# Patient Record
Sex: FEMALE | Race: WHITE | NOT HISPANIC OR LATINO | Employment: PART TIME | URBAN - METROPOLITAN AREA
[De-identification: names, ages, dates, MRNs, and addresses within clinical notes are randomized per-mention and may not be internally consistent; named-entity substitution may affect disease eponyms.]

---

## 2023-12-21 ENCOUNTER — HOSPITAL ENCOUNTER (EMERGENCY)
Facility: HOSPITAL | Age: 33
Discharge: HOME | End: 2023-12-21
Attending: EMERGENCY MEDICINE
Payer: COMMERCIAL

## 2023-12-21 VITALS
HEART RATE: 66 BPM | HEIGHT: 61 IN | BODY MASS INDEX: 27.38 KG/M2 | RESPIRATION RATE: 16 BRPM | DIASTOLIC BLOOD PRESSURE: 88 MMHG | TEMPERATURE: 98.2 F | WEIGHT: 145 LBS | SYSTOLIC BLOOD PRESSURE: 138 MMHG | OXYGEN SATURATION: 100 %

## 2023-12-21 DIAGNOSIS — R00.2 PALPITATIONS: ICD-10-CM

## 2023-12-21 DIAGNOSIS — R00.0 SINUS TACHYCARDIA: ICD-10-CM

## 2023-12-21 DIAGNOSIS — G47.00 INSOMNIA, UNSPECIFIED TYPE: Primary | ICD-10-CM

## 2023-12-21 LAB
ANION GAP SERPL CALCULATED.3IONS-SCNC: 10.5 MMOL/L (ref 5–15)
BASOPHILS # BLD AUTO: 0.04 10*3/MM3 (ref 0–0.2)
BASOPHILS NFR BLD AUTO: 0.8 % (ref 0–1.5)
BUN SERPL-MCNC: 9 MG/DL (ref 6–20)
BUN/CREAT SERPL: 13 (ref 7–25)
CALCIUM SPEC-SCNC: 9.4 MG/DL (ref 8.6–10.5)
CHLORIDE SERPL-SCNC: 105 MMOL/L (ref 98–107)
CO2 SERPL-SCNC: 24.5 MMOL/L (ref 22–29)
CREAT SERPL-MCNC: 0.69 MG/DL (ref 0.57–1)
DEPRECATED RDW RBC AUTO: 39.7 FL (ref 37–54)
EGFRCR SERPLBLD CKD-EPI 2021: 117 ML/MIN/1.73
EOSINOPHIL # BLD AUTO: 0.11 10*3/MM3 (ref 0–0.4)
EOSINOPHIL NFR BLD AUTO: 2.1 % (ref 0.3–6.2)
ERYTHROCYTE [DISTWIDTH] IN BLOOD BY AUTOMATED COUNT: 12.2 % (ref 12.3–15.4)
GLUCOSE SERPL-MCNC: 91 MG/DL (ref 65–99)
HCG SERPL QL: NEGATIVE
HCT VFR BLD AUTO: 41.7 % (ref 34–46.6)
HGB BLD-MCNC: 13.7 G/DL (ref 12–15.9)
IMM GRANULOCYTES # BLD AUTO: 0.02 10*3/MM3 (ref 0–0.05)
IMM GRANULOCYTES NFR BLD AUTO: 0.4 % (ref 0–0.5)
LYMPHOCYTES # BLD AUTO: 1.93 10*3/MM3 (ref 0.7–3.1)
LYMPHOCYTES NFR BLD AUTO: 37 % (ref 19.6–45.3)
MAGNESIUM SERPL-MCNC: 2.2 MG/DL (ref 1.6–2.6)
MCH RBC QN AUTO: 29 PG (ref 26.6–33)
MCHC RBC AUTO-ENTMCNC: 32.9 G/DL (ref 31.5–35.7)
MCV RBC AUTO: 88.2 FL (ref 79–97)
MONOCYTES # BLD AUTO: 0.7 10*3/MM3 (ref 0.1–0.9)
MONOCYTES NFR BLD AUTO: 13.4 % (ref 5–12)
NEUTROPHILS NFR BLD AUTO: 2.42 10*3/MM3 (ref 1.7–7)
NEUTROPHILS NFR BLD AUTO: 46.3 % (ref 42.7–76)
NRBC BLD AUTO-RTO: 0.2 /100 WBC (ref 0–0.2)
PLATELET # BLD AUTO: 238 10*3/MM3 (ref 140–450)
PMV BLD AUTO: 10.5 FL (ref 6–12)
POTASSIUM SERPL-SCNC: 3.9 MMOL/L (ref 3.5–5.2)
RBC # BLD AUTO: 4.73 10*6/MM3 (ref 3.77–5.28)
SODIUM SERPL-SCNC: 140 MMOL/L (ref 136–145)
T4 FREE SERPL-MCNC: 1.75 NG/DL (ref 0.93–1.7)
TSH SERPL DL<=0.05 MIU/L-ACNC: 3.52 UIU/ML (ref 0.27–4.2)
WBC NRBC COR # BLD AUTO: 5.22 10*3/MM3 (ref 3.4–10.8)

## 2023-12-21 PROCEDURE — 84703 CHORIONIC GONADOTROPIN ASSAY: CPT | Performed by: EMERGENCY MEDICINE

## 2023-12-21 PROCEDURE — 93005 ELECTROCARDIOGRAM TRACING: CPT | Performed by: EMERGENCY MEDICINE

## 2023-12-21 PROCEDURE — 84439 ASSAY OF FREE THYROXINE: CPT | Performed by: EMERGENCY MEDICINE

## 2023-12-21 PROCEDURE — 80048 BASIC METABOLIC PNL TOTAL CA: CPT | Performed by: EMERGENCY MEDICINE

## 2023-12-21 PROCEDURE — 85025 COMPLETE CBC W/AUTO DIFF WBC: CPT | Performed by: EMERGENCY MEDICINE

## 2023-12-21 PROCEDURE — 99283 EMERGENCY DEPT VISIT LOW MDM: CPT

## 2023-12-21 PROCEDURE — 83735 ASSAY OF MAGNESIUM: CPT | Performed by: EMERGENCY MEDICINE

## 2023-12-21 PROCEDURE — 84443 ASSAY THYROID STIM HORMONE: CPT | Performed by: EMERGENCY MEDICINE

## 2023-12-21 PROCEDURE — 36415 COLL VENOUS BLD VENIPUNCTURE: CPT

## 2023-12-21 RX ORDER — HYDROXYZINE PAMOATE 25 MG/1
50 CAPSULE ORAL ONCE
Status: COMPLETED | OUTPATIENT
Start: 2023-12-21 | End: 2023-12-21

## 2023-12-21 RX ORDER — DEXTROAMPHETAMINE SACCHARATE, AMPHETAMINE ASPARTATE, DEXTROAMPHETAMINE SULFATE AND AMPHETAMINE SULFATE 5; 5; 5; 5 MG/1; MG/1; MG/1; MG/1
20 TABLET ORAL 3 TIMES DAILY
COMMUNITY

## 2023-12-21 RX ORDER — LORAZEPAM 1 MG/1
1 TABLET ORAL ONCE
Status: COMPLETED | OUTPATIENT
Start: 2023-12-21 | End: 2023-12-21

## 2023-12-21 RX ORDER — HYDROXYZINE PAMOATE 50 MG/1
50-100 CAPSULE ORAL NIGHTLY PRN
Qty: 20 CAPSULE | Refills: 0 | Status: SHIPPED | OUTPATIENT
Start: 2023-12-21

## 2023-12-21 RX ADMIN — HYDROXYZINE PAMOATE 50 MG: 25 CAPSULE ORAL at 05:05

## 2023-12-21 RX ADMIN — LORAZEPAM 1 MG: 1 TABLET ORAL at 04:07

## 2023-12-21 NOTE — Clinical Note
[facility] EMERGENCY DEPARTMENT  [address]  [facility] [address]  Phone: [phone]    Angelia Clarke was seen and treated in our emergency department on 12/21/2023.  She may return to work on 12/22/2023.         Thank you for choosing [facility].    Graham Diams MD

## 2023-12-21 NOTE — ED PROVIDER NOTES
" EMERGENCY DEPARTMENT ENCOUNTER    Room Number:  12/12  PCP: Provider, No Known  Historian: Patient      HPI:  Chief Complaint: Insomnia  A complete HPI/ROS/PMH/PSH/SH/FH are unobtainable due to: Nothing  Context: Angelia Clarke is a 33 y.o. female who presents to the ED from home by private vehicle c/o insomnia for the past 3 days.  Patient tested positive for COVID 1 week ago.  She then slept \"all day\" for the next several days.  She has now been unable to sleep for the past 72 hours.  She has taken NyQuil and trazodone without relief.  Patient says that she feels tired but just cannot fall asleep..  She takes Adderall 3 times daily but has been on this for many years and is never affected her sleep.  She drinks several caffeinated beverages daily but this is also not unusual for her.  She complains of intermittent palpitations and feels like her heart is racing when she tries to go to sleep.  Denies fever, shortness of breath, dizziness, chest pain, abdominal pain, vomiting, or diarrhea.  She denies any recent stressors.  LMP was about 1 week ago.            PAST MEDICAL HISTORY  Active Ambulatory Problems     Diagnosis Date Noted    No Active Ambulatory Problems     Resolved Ambulatory Problems     Diagnosis Date Noted    No Resolved Ambulatory Problems     Past Medical History:   Diagnosis Date    ADHD          PAST SURGICAL HISTORY  History reviewed. No pertinent surgical history.      FAMILY HISTORY  History reviewed. No pertinent family history.      SOCIAL HISTORY  Social History     Socioeconomic History    Marital status: Single   Tobacco Use    Smoking status: Never    Smokeless tobacco: Never   Vaping Use    Vaping Use: Every day    Start date: 1/1/2018    Substances: Nicotine, Flavoring   Substance and Sexual Activity    Alcohol use: Yes     Alcohol/week: 3.0 standard drinks of alcohol     Types: 3 Cans of beer per week    Drug use: Never         ALLERGIES  Patient has no known allergies.    REVIEW OF " SYSTEMS  All systems have been reviewed and are negative except for those listed in the HPI      PHYSICAL EXAM  ED Triage Vitals   Temp Heart Rate Resp BP SpO2   12/21/23 0328 12/21/23 0328 12/21/23 0328 12/21/23 0339 12/21/23 0328   98.2 °F (36.8 °C) 107 18 141/98 100 %      Temp src Heart Rate Source Patient Position BP Location FiO2 (%)   12/21/23 0328 -- 12/21/23 0339 12/21/23 0339 --   Tympanic  Sitting Right arm        Physical Exam      GENERAL: Awake, alert, oriented x 3.  Well-developed, well-nourished female.  Resting comfortably in no acute distress  HENT: NCAT, nares patent  EYES: no scleral icterus  CV: regular rhythm, mildly tachycardic  RESPIRATORY: normal effort, clear to auscultation bilaterally  ABDOMEN: soft, nondistended, nontender  MUSCULOSKELETAL: Extremities are nontender with full range of motion  NEURO: Speech is clear and fluent.  No facial droop.  Follows commands  PSYCH:  calm, cooperative  SKIN: warm, dry    Vital signs and nursing notes reviewed.          LAB RESULTS  Recent Results (from the past 24 hour(s))   Basic Metabolic Panel    Collection Time: 12/21/23  4:04 AM    Specimen: Arm, Right; Blood   Result Value Ref Range    Glucose 91 65 - 99 mg/dL    BUN 9 6 - 20 mg/dL    Creatinine 0.69 0.57 - 1.00 mg/dL    Sodium 140 136 - 145 mmol/L    Potassium 3.9 3.5 - 5.2 mmol/L    Chloride 105 98 - 107 mmol/L    CO2 24.5 22.0 - 29.0 mmol/L    Calcium 9.4 8.6 - 10.5 mg/dL    BUN/Creatinine Ratio 13.0 7.0 - 25.0    Anion Gap 10.5 5.0 - 15.0 mmol/L    eGFR 117.0 >60.0 mL/min/1.73   TSH    Collection Time: 12/21/23  4:04 AM    Specimen: Arm, Right; Blood   Result Value Ref Range    TSH 3.520 0.270 - 4.200 uIU/mL   T4, Free    Collection Time: 12/21/23  4:04 AM    Specimen: Arm, Right; Blood   Result Value Ref Range    Free T4 1.75 (H) 0.93 - 1.70 ng/dL   Magnesium    Collection Time: 12/21/23  4:04 AM    Specimen: Arm, Right; Blood   Result Value Ref Range    Magnesium 2.2 1.6 - 2.6 mg/dL    hCG, Serum, Qualitative    Collection Time: 12/21/23  4:04 AM    Specimen: Arm, Right; Blood   Result Value Ref Range    HCG Qualitative Negative Negative   CBC Auto Differential    Collection Time: 12/21/23  4:04 AM    Specimen: Arm, Right; Blood   Result Value Ref Range    WBC 5.22 3.40 - 10.80 10*3/mm3    RBC 4.73 3.77 - 5.28 10*6/mm3    Hemoglobin 13.7 12.0 - 15.9 g/dL    Hematocrit 41.7 34.0 - 46.6 %    MCV 88.2 79.0 - 97.0 fL    MCH 29.0 26.6 - 33.0 pg    MCHC 32.9 31.5 - 35.7 g/dL    RDW 12.2 (L) 12.3 - 15.4 %    RDW-SD 39.7 37.0 - 54.0 fl    MPV 10.5 6.0 - 12.0 fL    Platelets 238 140 - 450 10*3/mm3    Neutrophil % 46.3 42.7 - 76.0 %    Lymphocyte % 37.0 19.6 - 45.3 %    Monocyte % 13.4 (H) 5.0 - 12.0 %    Eosinophil % 2.1 0.3 - 6.2 %    Basophil % 0.8 0.0 - 1.5 %    Immature Grans % 0.4 0.0 - 0.5 %    Neutrophils, Absolute 2.42 1.70 - 7.00 10*3/mm3    Lymphocytes, Absolute 1.93 0.70 - 3.10 10*3/mm3    Monocytes, Absolute 0.70 0.10 - 0.90 10*3/mm3    Eosinophils, Absolute 0.11 0.00 - 0.40 10*3/mm3    Basophils, Absolute 0.04 0.00 - 0.20 10*3/mm3    Immature Grans, Absolute 0.02 0.00 - 0.05 10*3/mm3    nRBC 0.2 0.0 - 0.2 /100 WBC       Ordered the above labs and reviewed the results.        RADIOLOGY  No Radiology Exams Resulted Within Past 24 Hours    Ordered the above noted radiological studies. Reviewed by me in PACS.            PROCEDURES  Procedures              MEDICATIONS GIVEN IN ER  Medications   LORazepam (ATIVAN) tablet 1 mg (1 mg Oral Given 12/21/23 4420)   hydrOXYzine pamoate (VISTARIL) capsule 50 mg (50 mg Oral Given 12/21/23 3824)                   MEDICAL DECISION MAKING, PROGRESS, and CONSULTS    All labs have been independently reviewed by me.  All radiology studies have been reviewed by me and I have also reviewed the radiology report.   EKG's independently viewed and interpreted by me.  Discussion below represents my analysis of pertinent findings related to patient's condition,  differential diagnosis, treatment plan and final disposition.      Additional sources:  - Discussed/ obtained information from independent historians: None    - External (non-ED) record review: Patient does not have any prior records in the EMR    - Chronic or social conditions impacting care: Patient does not currently have a primary care provider          Orders placed during this visit:  Orders Placed This Encounter   Procedures    Basic Metabolic Panel    TSH    T4, Free    Magnesium    hCG, Serum, Qualitative    CBC Auto Differential    ECG 12 Lead Other; Palpitations    CBC & Differential         Additional orders considered but not ordered:  None        Differential diagnosis:    Anxiety, stress reaction, hyperthyroidism, sinus tachycardia, arrhythmia      Independent interpretation of labs, radiology studies, and discussions with consultants:  ED Course as of 12/21/23 0612   u Dec 21, 2023   0417 EKG personally interpreted by me at 4:04 AM.  My personal interpretation is:          EKG time: 3:59 AM  Rhythm/Rate: Sinus rhythm, rate 79  P waves and NM: Normal  QRS, axis: Normal  ST and T waves: Normal    Interpreted Contemporaneously by me, independently viewed  No prior available for comparison    [WH]   0418 WBC: 5.22 [WH]   0418 Hemoglobin: 13.7 [WH]   0434 Magnesium: 2.2 [WH]   0434 Glucose: 91 [WH]   0434 Creatinine: 0.69 [WH]   0446 HCG Qualitative: Negative [WH]   0446 Free T4(!): 1.75 [WH]   0446 TSH Baseline: 3.520 [WH]   0457 Test results discussed with the patient.  She no longer feels like her heart is racing but she has not been able to fall asleep.  She will be given a dose of Vistaril. [WH]   0511 MDM: Patient presented to ED complaining of insomnia for the past 3 days.  She also complained of palpitations.  She denied increased stress or anxiety.  She was mildly tachycardic but was not hypoxic or tachypneic.  She denied chest pain or shortness of breath.  Labs were unremarkable.  Thyroid  studies and electrolytes were normal.  Pregnancy test was negative.  She was given Ativan and Vistaril.  She will be discharged with a prescription for Vistaril.  She was given the patient connection number to call to help her obtain a primary care provider. [WH]      ED Course User Index  [WH] Graham Dimas MD               DIAGNOSIS  Final diagnoses:   Insomnia, unspecified type   Palpitations   Sinus tachycardia         DISPOSITION  DISCHARGE    Patient discharged in stable condition.    Reviewed implications of results, diagnosis, meds, responsibility to follow up, warning signs and symptoms of possible worsening, potential complications and reasons to return to ER, including worsening/persistent symptoms, chest pain, shortness of breath, dizziness, fainting, or other concern.    Patient/Family voiced understanding of above instructions.    Discussed plan for discharge, as there is no emergent indication for admission. Patient referred to primary care provider for BP management due to today's BP. Pt/family is agreeable and understands need for follow up and repeat testing.  Pt is aware that discharge does not mean that nothing is wrong but it indicates no emergency is present that requires admission and they must continue care with follow-up as given below or physician of their choice.     FOLLOW-UP  PATIENT CONNECTION - [place] [address]  [phone]             Medication List        New Prescriptions      hydrOXYzine pamoate 50 MG capsule  Commonly known as: VISTARIL  Take 1-2 capsules by mouth At Night As Needed (Insomnia).               Where to Get Your Medications        These medications were sent to [facility] [id number] - [place], [place] - [address] AT [address] [facility] [address] - [phone] [facility] [phone]   [address], [place] [address]      Phone: [phone]   hydrOXYzine pamoate 50 MG capsule                   Latest Documented  Vital Signs:  As of 06:12 EST  BP- 138/88 HR- 66 Temp- 98.2 °F (36.8 °C) (Tympanic) O2 sat- 100%              --    Please note that portions of this were completed with a voice recognition program.       Note Disclaimer: At [facility], we believe that sharing information builds trust and better relationships. You are receiving this note because you are receiving care at [facility] or recently visited. It is possible you will see health information before a provider has talked with you about it. This kind of information can be easy to misunderstand. To help you fully understand what it means for your health, we urge you to discuss this note with your provider.             Graham Dimas MD  12/21/23 0629

## 2023-12-21 NOTE — ED TRIAGE NOTES
Pt arrived to ER via private car.    Pt complains of insomnia x3 days. Pt states she has tried taking medications to help her sleep and nothing has worked.

## 2023-12-21 NOTE — DISCHARGE INSTRUCTIONS
Take medication as prescribed.  Do not drink any caffeine for at least 6 hours before you go to bed.  Call the patient connection number for assistance in obtaining a primary care provider for follow-up.  Return to the emergency department for worsening/persistent symptoms, chest pain, shortness of breath, dizziness, or other concern.

## 2023-12-22 LAB
QT INTERVAL: 390 MS
QTC INTERVAL: 448 MS